# Patient Record
Sex: MALE | Race: WHITE | NOT HISPANIC OR LATINO | URBAN - METROPOLITAN AREA
[De-identification: names, ages, dates, MRNs, and addresses within clinical notes are randomized per-mention and may not be internally consistent; named-entity substitution may affect disease eponyms.]

---

## 2023-05-08 ENCOUNTER — OFFICE VISIT (OUTPATIENT)
Dept: URGENT CARE | Facility: CLINIC | Age: 33
End: 2023-05-08

## 2023-05-08 VITALS
WEIGHT: 188 LBS | HEART RATE: 63 BPM | RESPIRATION RATE: 18 BRPM | TEMPERATURE: 98.1 F | OXYGEN SATURATION: 99 % | SYSTOLIC BLOOD PRESSURE: 146 MMHG | DIASTOLIC BLOOD PRESSURE: 80 MMHG

## 2023-05-08 DIAGNOSIS — S05.01XA ABRASION OF RIGHT CORNEA, INITIAL ENCOUNTER: Primary | ICD-10-CM

## 2023-05-08 RX ORDER — TOBRAMYCIN 3 MG/ML
1 SOLUTION/ DROPS OPHTHALMIC
Qty: 5 ML | Refills: 0 | Status: SHIPPED | OUTPATIENT
Start: 2023-05-08

## 2023-05-08 NOTE — PROGRESS NOTES
[facility]        NAME: Beth Lewis is a 28 y o  male  : 1990    MRN: [id number]  DATE: May 8, 2023  TIME: 7:51 PM    Assessment and Orders   Abrasion of right cornea, initial encounter [S05 [id number]]  1  Abrasion of right cornea, initial encounter  tobramycin (TOBREX) 0 3 % SOLN            Plan and Discussion      Symptoms and exam consistent with abrasion of the right corneal epithelium seen with fluorescein stain  Will treat topically with Tobrex and patient to follow-up with eye doctor  Discussed ED precautions including (but not limited to)  • Difficultly breathing or shortness of breath  • Chest pain  • Acutely worsening symptoms  Risks and benefits discussed  Patient understands and agrees with the plan  Follow up with PCP  Chief Complaint     Chief Complaint   Patient presents with   • Eye Problem     Pt states he jammed a stick into his right eye by accident today around noon  Feels bruised and thinks something is still in it  Has been applying cool rags  History of Present Illness       Eye Problem   The right eye is affected  This is a new problem  The current episode started yesterday  The problem occurs constantly  The problem has been gradually worsening  The injury mechanism was a foreign body (Poked in the eye with a stick)  He does not wear contacts  Associated symptoms include blurred vision, eye redness, a foreign body sensation and photophobia  Pertinent negatives include no eye discharge, double vision or fever  He has tried commercial eye wash for the symptoms  The treatment provided mild relief  Review of Systems   Review of Systems   Constitutional: Negative for fever  Eyes: Positive for blurred vision, photophobia and redness  Negative for double vision and discharge           Current Medications       Current Outpatient Medications:   •  tobramycin (TOBREX) 0 3 % SOLN, Administer 1 drop to the right eye every 4 (four) hours while awake, Disp: 5 mL, Rfl: 0    Current Allergies     Allergies as of 05/08/2023   • (No Known Allergies)            The following portions of the patient's history were reviewed and updated as appropriate: allergies, current medications, past family history, past medical history, past social history, past surgical history and problem list      History reviewed  No pertinent past medical history  History reviewed  No pertinent surgical history  History reviewed  No pertinent family history  Medications have been verified  Objective   /80   Pulse 63   Temp 98 1 °F (36 7 °C)   Resp 18   Wt 85 3 kg (188 lb)   SpO2 99%   No LMP for male patient  Physical Exam     Physical Exam  Constitutional:       General: He is not in acute distress  Eyes:      General: Lids are normal  Lids are everted, no foreign bodies appreciated  Right eye: No foreign body, discharge or hordeolum  Extraocular Movements:      Right eye: Normal extraocular motion  Conjunctiva/sclera:      Right eye: Right conjunctiva is injected  Pupils:      Right eye: Corneal abrasion and fluorescein uptake present  Pulmonary:      Effort: No respiratory distress  Neurological:      General: No focal deficit present  Mental Status: He is alert and oriented to person, place, and time     Psychiatric:         Mood and Affect: Mood normal          Behavior: Behavior normal                Jim Early DO